# Patient Record
Sex: FEMALE | Race: WHITE | Employment: OTHER | URBAN - METROPOLITAN AREA
[De-identification: names, ages, dates, MRNs, and addresses within clinical notes are randomized per-mention and may not be internally consistent; named-entity substitution may affect disease eponyms.]

---

## 2020-06-29 DIAGNOSIS — Z11.59 ENCOUNTER FOR SCREENING FOR OTHER VIRAL DISEASES: Primary | ICD-10-CM

## 2020-07-09 RX ORDER — IBUPROFEN 200 MG
200-400 TABLET ORAL DAILY PRN
Status: ON HOLD | COMMUNITY
End: 2020-07-14

## 2020-07-10 DIAGNOSIS — Z11.59 ENCOUNTER FOR SCREENING FOR OTHER VIRAL DISEASES: ICD-10-CM

## 2020-07-10 PROCEDURE — U0003 INFECTIOUS AGENT DETECTION BY NUCLEIC ACID (DNA OR RNA); SEVERE ACUTE RESPIRATORY SYNDROME CORONAVIRUS 2 (SARS-COV-2) (CORONAVIRUS DISEASE [COVID-19]), AMPLIFIED PROBE TECHNIQUE, MAKING USE OF HIGH THROUGHPUT TECHNOLOGIES AS DESCRIBED BY CMS-2020-01-R: HCPCS | Performed by: ORTHOPAEDIC SURGERY

## 2020-07-10 PROCEDURE — 99207 ZZC NO BILLABLE SERVICE THIS VISIT: CPT

## 2020-07-10 NOTE — PROGRESS NOTES
PTA medications updated by Medication Scribe prior to surgery via phone call with patient      -LAST DOSES ENTERED BY NURSE-    Medication history sources: Patient, Surescripts and H&P  Medication history source reliability: Good  Adherence assessment: N/A Not Observed    Significant changes made to the medication list:  None      Additional medication history information:   None        Prior to Admission medications    Medication Sig Last Dose Taking? Auth Provider   ibuprofen (ADVIL/MOTRIN) 200 MG tablet Take 200-400 mg by mouth daily as needed   at PRN Yes Reported, Patient

## 2020-07-11 LAB
SARS-COV-2 RNA SPEC QL NAA+PROBE: NOT DETECTED
SPECIMEN SOURCE: NORMAL

## 2020-07-13 ENCOUNTER — HOSPITAL ENCOUNTER (INPATIENT)
Facility: CLINIC | Age: 80
LOS: 1 days | Discharge: HOME | DRG: 469 | End: 2020-07-14
Attending: ORTHOPAEDIC SURGERY | Admitting: ORTHOPAEDIC SURGERY
Payer: COMMERCIAL

## 2020-07-13 ENCOUNTER — ANESTHESIA (OUTPATIENT)
Dept: SURGERY | Facility: CLINIC | Age: 80
DRG: 469 | End: 2020-07-13
Payer: COMMERCIAL

## 2020-07-13 ENCOUNTER — APPOINTMENT (OUTPATIENT)
Dept: GENERAL RADIOLOGY | Facility: CLINIC | Age: 80
DRG: 469 | End: 2020-07-13
Attending: ORTHOPAEDIC SURGERY
Payer: COMMERCIAL

## 2020-07-13 ENCOUNTER — ANESTHESIA EVENT (OUTPATIENT)
Dept: SURGERY | Facility: CLINIC | Age: 80
DRG: 469 | End: 2020-07-13
Payer: COMMERCIAL

## 2020-07-13 DIAGNOSIS — Z96.661 STATUS POST RIGHT ANKLE JOINT REPLACEMENT: Primary | ICD-10-CM

## 2020-07-13 PROBLEM — Z96.669 S/P ANKLE JOINT REPLACEMENT: Status: ACTIVE | Noted: 2020-07-13

## 2020-07-13 LAB
CREAT SERPL-MCNC: 0.92 MG/DL (ref 0.52–1.04)
GFR SERPL CREATININE-BSD FRML MDRD: 59 ML/MIN/{1.73_M2}
PLATELET # BLD AUTO: 347 10E9/L (ref 150–450)

## 2020-07-13 PROCEDURE — 36000063 ZZH SURGERY LEVEL 4 EA 15 ADDTL MIN: Performed by: ORTHOPAEDIC SURGERY

## 2020-07-13 PROCEDURE — 27211022 ZZHC OR IOM SUPPLIES OPNP: Performed by: ORTHOPAEDIC SURGERY

## 2020-07-13 PROCEDURE — 25800030 ZZH RX IP 258 OP 636: Performed by: PHYSICIAN ASSISTANT

## 2020-07-13 PROCEDURE — 25000128 H RX IP 250 OP 636: Performed by: PHYSICIAN ASSISTANT

## 2020-07-13 PROCEDURE — 25000125 ZZHC RX 250: Performed by: NURSE ANESTHETIST, CERTIFIED REGISTERED

## 2020-07-13 PROCEDURE — 25000132 ZZH RX MED GY IP 250 OP 250 PS 637: Performed by: PHYSICIAN ASSISTANT

## 2020-07-13 PROCEDURE — 25000128 H RX IP 250 OP 636: Performed by: ANESTHESIOLOGY

## 2020-07-13 PROCEDURE — 25000125 ZZHC RX 250: Performed by: ORTHOPAEDIC SURGERY

## 2020-07-13 PROCEDURE — 27110028 ZZH OR GENERAL SUPPLY NON-STERILE: Performed by: ORTHOPAEDIC SURGERY

## 2020-07-13 PROCEDURE — 37000009 ZZH ANESTHESIA TECHNICAL FEE, EACH ADDTL 15 MIN: Performed by: ORTHOPAEDIC SURGERY

## 2020-07-13 PROCEDURE — C1713 ANCHOR/SCREW BN/BN,TIS/BN: HCPCS | Performed by: ORTHOPAEDIC SURGERY

## 2020-07-13 PROCEDURE — 27210794 ZZH OR GENERAL SUPPLY STERILE: Performed by: ORTHOPAEDIC SURGERY

## 2020-07-13 PROCEDURE — 25800030 ZZH RX IP 258 OP 636: Performed by: ANESTHESIOLOGY

## 2020-07-13 PROCEDURE — 37000008 ZZH ANESTHESIA TECHNICAL FEE, 1ST 30 MIN: Performed by: ORTHOPAEDIC SURGERY

## 2020-07-13 PROCEDURE — 82565 ASSAY OF CREATININE: CPT | Performed by: PHYSICIAN ASSISTANT

## 2020-07-13 PROCEDURE — 36000065 ZZH SURGERY LEVEL 4 W FLUORO 1ST 30 MIN: Performed by: ORTHOPAEDIC SURGERY

## 2020-07-13 PROCEDURE — 85049 AUTOMATED PLATELET COUNT: CPT | Performed by: PHYSICIAN ASSISTANT

## 2020-07-13 PROCEDURE — 36415 COLL VENOUS BLD VENIPUNCTURE: CPT | Performed by: PHYSICIAN ASSISTANT

## 2020-07-13 PROCEDURE — 25000128 H RX IP 250 OP 636: Performed by: NURSE ANESTHETIST, CERTIFIED REGISTERED

## 2020-07-13 PROCEDURE — 71000012 ZZH RECOVERY PHASE 1 LEVEL 1 FIRST HR: Performed by: ORTHOPAEDIC SURGERY

## 2020-07-13 PROCEDURE — 25000125 ZZHC RX 250: Performed by: ANESTHESIOLOGY

## 2020-07-13 PROCEDURE — 40000171 ZZH STATISTIC PRE-PROCEDURE ASSESSMENT III: Performed by: ORTHOPAEDIC SURGERY

## 2020-07-13 PROCEDURE — 25000566 ZZH SEVOFLURANE, EA 15 MIN: Performed by: ORTHOPAEDIC SURGERY

## 2020-07-13 PROCEDURE — 40000277 XR SURGERY CARM FLUORO LESS THAN 5 MIN W STILLS

## 2020-07-13 PROCEDURE — C1776 JOINT DEVICE (IMPLANTABLE): HCPCS | Performed by: ORTHOPAEDIC SURGERY

## 2020-07-13 PROCEDURE — 12000000 ZZH R&B MED SURG/OB

## 2020-07-13 DEVICE — IMPLANTABLE DEVICE: Type: IMPLANTABLE DEVICE | Site: ANKLE | Status: FUNCTIONAL

## 2020-07-13 DEVICE — IMP SCR ARTHREX BLUE LOCKING 3.5X26MM AR-8935L-26: Type: IMPLANTABLE DEVICE | Site: ANKLE | Status: FUNCTIONAL

## 2020-07-13 DEVICE — IMP SCR ARTHREX CAN 4.0X30MM AR-8940-30: Type: IMPLANTABLE DEVICE | Site: ANKLE | Status: FUNCTIONAL

## 2020-07-13 DEVICE — IMP PLATE ARTHREX MIDFOOT FLAT H SM RT AR-8942R-S: Type: IMPLANTABLE DEVICE | Site: ANKLE | Status: FUNCTIONAL

## 2020-07-13 RX ORDER — FENTANYL CITRATE 0.05 MG/ML
50 INJECTION, SOLUTION INTRAMUSCULAR; INTRAVENOUS
Status: COMPLETED | OUTPATIENT
Start: 2020-07-13 | End: 2020-07-13

## 2020-07-13 RX ORDER — NALOXONE HYDROCHLORIDE 0.4 MG/ML
.1-.4 INJECTION, SOLUTION INTRAMUSCULAR; INTRAVENOUS; SUBCUTANEOUS
Status: DISCONTINUED | OUTPATIENT
Start: 2020-07-13 | End: 2020-07-13

## 2020-07-13 RX ORDER — FENTANYL CITRATE 50 UG/ML
INJECTION, SOLUTION INTRAMUSCULAR; INTRAVENOUS PRN
Status: DISCONTINUED | OUTPATIENT
Start: 2020-07-13 | End: 2020-07-13

## 2020-07-13 RX ORDER — HYDROMORPHONE HYDROCHLORIDE 1 MG/ML
.3-.5 INJECTION, SOLUTION INTRAMUSCULAR; INTRAVENOUS; SUBCUTANEOUS
Status: DISCONTINUED | OUTPATIENT
Start: 2020-07-13 | End: 2020-07-14 | Stop reason: HOSPADM

## 2020-07-13 RX ORDER — ONDANSETRON 2 MG/ML
4 INJECTION INTRAMUSCULAR; INTRAVENOUS EVERY 6 HOURS PRN
Status: DISCONTINUED | OUTPATIENT
Start: 2020-07-13 | End: 2020-07-14 | Stop reason: HOSPADM

## 2020-07-13 RX ORDER — ONDANSETRON 4 MG/1
4 TABLET, ORALLY DISINTEGRATING ORAL EVERY 30 MIN PRN
Status: DISCONTINUED | OUTPATIENT
Start: 2020-07-13 | End: 2020-07-13

## 2020-07-13 RX ORDER — NALOXONE HYDROCHLORIDE 0.4 MG/ML
.1-.4 INJECTION, SOLUTION INTRAMUSCULAR; INTRAVENOUS; SUBCUTANEOUS
Status: DISCONTINUED | OUTPATIENT
Start: 2020-07-13 | End: 2020-07-14 | Stop reason: HOSPADM

## 2020-07-13 RX ORDER — ACETAMINOPHEN 325 MG/1
650 TABLET ORAL EVERY 4 HOURS PRN
Status: DISCONTINUED | OUTPATIENT
Start: 2020-07-16 | End: 2020-07-14 | Stop reason: HOSPADM

## 2020-07-13 RX ORDER — SODIUM CHLORIDE, SODIUM LACTATE, POTASSIUM CHLORIDE, CALCIUM CHLORIDE 600; 310; 30; 20 MG/100ML; MG/100ML; MG/100ML; MG/100ML
INJECTION, SOLUTION INTRAVENOUS CONTINUOUS
Status: DISCONTINUED | OUTPATIENT
Start: 2020-07-13 | End: 2020-07-13 | Stop reason: HOSPADM

## 2020-07-13 RX ORDER — HYDROXYZINE HYDROCHLORIDE 10 MG/1
10 TABLET, FILM COATED ORAL EVERY 6 HOURS PRN
Status: DISCONTINUED | OUTPATIENT
Start: 2020-07-13 | End: 2020-07-14 | Stop reason: HOSPADM

## 2020-07-13 RX ORDER — METOCLOPRAMIDE 5 MG/1
5 TABLET ORAL EVERY 6 HOURS PRN
Status: DISCONTINUED | OUTPATIENT
Start: 2020-07-13 | End: 2020-07-14 | Stop reason: HOSPADM

## 2020-07-13 RX ORDER — CEFAZOLIN SODIUM 2 G/100ML
2 INJECTION, SOLUTION INTRAVENOUS
Status: DISCONTINUED | OUTPATIENT
Start: 2020-07-13 | End: 2020-07-13 | Stop reason: HOSPADM

## 2020-07-13 RX ORDER — AMOXICILLIN 250 MG
1 CAPSULE ORAL 2 TIMES DAILY
Status: DISCONTINUED | OUTPATIENT
Start: 2020-07-13 | End: 2020-07-14 | Stop reason: HOSPADM

## 2020-07-13 RX ORDER — SODIUM CHLORIDE, SODIUM LACTATE, POTASSIUM CHLORIDE, CALCIUM CHLORIDE 600; 310; 30; 20 MG/100ML; MG/100ML; MG/100ML; MG/100ML
INJECTION, SOLUTION INTRAVENOUS CONTINUOUS
Status: DISCONTINUED | OUTPATIENT
Start: 2020-07-13 | End: 2020-07-13

## 2020-07-13 RX ORDER — DEXAMETHASONE SODIUM PHOSPHATE 4 MG/ML
4 INJECTION, SOLUTION INTRA-ARTICULAR; INTRALESIONAL; INTRAMUSCULAR; INTRAVENOUS; SOFT TISSUE EVERY 10 MIN PRN
Status: DISCONTINUED | OUTPATIENT
Start: 2020-07-13 | End: 2020-07-13 | Stop reason: CLARIF

## 2020-07-13 RX ORDER — MEPERIDINE HYDROCHLORIDE 25 MG/ML
12.5 INJECTION INTRAMUSCULAR; INTRAVENOUS; SUBCUTANEOUS
Status: DISCONTINUED | OUTPATIENT
Start: 2020-07-13 | End: 2020-07-13 | Stop reason: CLARIF

## 2020-07-13 RX ORDER — DIMENHYDRINATE 50 MG/ML
12.5 INJECTION, SOLUTION INTRAMUSCULAR; INTRAVENOUS
Status: DISCONTINUED | OUTPATIENT
Start: 2020-07-13 | End: 2020-07-14 | Stop reason: HOSPADM

## 2020-07-13 RX ORDER — ONDANSETRON 2 MG/ML
INJECTION INTRAMUSCULAR; INTRAVENOUS PRN
Status: DISCONTINUED | OUTPATIENT
Start: 2020-07-13 | End: 2020-07-13

## 2020-07-13 RX ORDER — METHOCARBAMOL 500 MG/1
500 TABLET, FILM COATED ORAL 4 TIMES DAILY PRN
Status: DISCONTINUED | OUTPATIENT
Start: 2020-07-13 | End: 2020-07-14 | Stop reason: HOSPADM

## 2020-07-13 RX ORDER — METOCLOPRAMIDE HYDROCHLORIDE 5 MG/ML
5 INJECTION INTRAMUSCULAR; INTRAVENOUS EVERY 6 HOURS PRN
Status: DISCONTINUED | OUTPATIENT
Start: 2020-07-13 | End: 2020-07-14 | Stop reason: HOSPADM

## 2020-07-13 RX ORDER — CEFAZOLIN SODIUM 1 G/3ML
1 INJECTION, POWDER, FOR SOLUTION INTRAMUSCULAR; INTRAVENOUS SEE ADMIN INSTRUCTIONS
Status: DISCONTINUED | OUTPATIENT
Start: 2020-07-13 | End: 2020-07-13 | Stop reason: HOSPADM

## 2020-07-13 RX ORDER — CEFAZOLIN SODIUM 2 G/100ML
2 INJECTION, SOLUTION INTRAVENOUS
Status: COMPLETED | OUTPATIENT
Start: 2020-07-13 | End: 2020-07-13

## 2020-07-13 RX ORDER — LIDOCAINE 40 MG/G
CREAM TOPICAL
Status: DISCONTINUED | OUTPATIENT
Start: 2020-07-13 | End: 2020-07-14 | Stop reason: HOSPADM

## 2020-07-13 RX ORDER — ACETAMINOPHEN 325 MG/1
975 TABLET ORAL EVERY 8 HOURS
Status: DISCONTINUED | OUTPATIENT
Start: 2020-07-13 | End: 2020-07-14 | Stop reason: HOSPADM

## 2020-07-13 RX ORDER — FENTANYL CITRATE 0.05 MG/ML
25-50 INJECTION, SOLUTION INTRAMUSCULAR; INTRAVENOUS EVERY 5 MIN PRN
Status: DISCONTINUED | OUTPATIENT
Start: 2020-07-13 | End: 2020-07-13 | Stop reason: CLARIF

## 2020-07-13 RX ORDER — CEFAZOLIN SODIUM 1 G/3ML
1 INJECTION, POWDER, FOR SOLUTION INTRAMUSCULAR; INTRAVENOUS EVERY 8 HOURS
Status: COMPLETED | OUTPATIENT
Start: 2020-07-13 | End: 2020-07-14

## 2020-07-13 RX ORDER — ALBUTEROL SULFATE 0.83 MG/ML
2.5 SOLUTION RESPIRATORY (INHALATION)
Status: DISCONTINUED | OUTPATIENT
Start: 2020-07-13 | End: 2020-07-13 | Stop reason: CLARIF

## 2020-07-13 RX ORDER — CALCIUM CARBONATE 500 MG/1
1000 TABLET, CHEWABLE ORAL 4 TIMES DAILY PRN
Status: DISCONTINUED | OUTPATIENT
Start: 2020-07-13 | End: 2020-07-14 | Stop reason: HOSPADM

## 2020-07-13 RX ORDER — OXYCODONE HYDROCHLORIDE 5 MG/1
5-10 TABLET ORAL EVERY 4 HOURS PRN
Status: DISCONTINUED | OUTPATIENT
Start: 2020-07-13 | End: 2020-07-14 | Stop reason: HOSPADM

## 2020-07-13 RX ORDER — ONDANSETRON 2 MG/ML
4 INJECTION INTRAMUSCULAR; INTRAVENOUS EVERY 30 MIN PRN
Status: DISCONTINUED | OUTPATIENT
Start: 2020-07-13 | End: 2020-07-13

## 2020-07-13 RX ORDER — ONDANSETRON 4 MG/1
4 TABLET, ORALLY DISINTEGRATING ORAL EVERY 6 HOURS PRN
Status: DISCONTINUED | OUTPATIENT
Start: 2020-07-13 | End: 2020-07-14 | Stop reason: HOSPADM

## 2020-07-13 RX ORDER — PROCHLORPERAZINE MALEATE 5 MG
5 TABLET ORAL EVERY 6 HOURS PRN
Status: DISCONTINUED | OUTPATIENT
Start: 2020-07-13 | End: 2020-07-14 | Stop reason: HOSPADM

## 2020-07-13 RX ORDER — PROPOFOL 10 MG/ML
INJECTION, EMULSION INTRAVENOUS CONTINUOUS PRN
Status: DISCONTINUED | OUTPATIENT
Start: 2020-07-13 | End: 2020-07-13

## 2020-07-13 RX ORDER — MAGNESIUM HYDROXIDE 1200 MG/15ML
LIQUID ORAL PRN
Status: DISCONTINUED | OUTPATIENT
Start: 2020-07-13 | End: 2020-07-13 | Stop reason: HOSPADM

## 2020-07-13 RX ORDER — SODIUM CHLORIDE, SODIUM LACTATE, POTASSIUM CHLORIDE, CALCIUM CHLORIDE 600; 310; 30; 20 MG/100ML; MG/100ML; MG/100ML; MG/100ML
INJECTION, SOLUTION INTRAVENOUS CONTINUOUS
Status: DISCONTINUED | OUTPATIENT
Start: 2020-07-13 | End: 2020-07-14 | Stop reason: HOSPADM

## 2020-07-13 RX ORDER — PROPOFOL 10 MG/ML
INJECTION, EMULSION INTRAVENOUS PRN
Status: DISCONTINUED | OUTPATIENT
Start: 2020-07-13 | End: 2020-07-13

## 2020-07-13 RX ORDER — AMOXICILLIN 250 MG
2 CAPSULE ORAL 2 TIMES DAILY
Status: DISCONTINUED | OUTPATIENT
Start: 2020-07-13 | End: 2020-07-14 | Stop reason: HOSPADM

## 2020-07-13 RX ORDER — LIDOCAINE HYDROCHLORIDE 20 MG/ML
INJECTION, SOLUTION INFILTRATION; PERINEURAL PRN
Status: DISCONTINUED | OUTPATIENT
Start: 2020-07-13 | End: 2020-07-13

## 2020-07-13 RX ORDER — DEXAMETHASONE SODIUM PHOSPHATE 4 MG/ML
INJECTION, SOLUTION INTRA-ARTICULAR; INTRALESIONAL; INTRAMUSCULAR; INTRAVENOUS; SOFT TISSUE PRN
Status: DISCONTINUED | OUTPATIENT
Start: 2020-07-13 | End: 2020-07-13

## 2020-07-13 RX ORDER — HYDROMORPHONE HYDROCHLORIDE 1 MG/ML
.3-.5 INJECTION, SOLUTION INTRAMUSCULAR; INTRAVENOUS; SUBCUTANEOUS EVERY 10 MIN PRN
Status: DISCONTINUED | OUTPATIENT
Start: 2020-07-13 | End: 2020-07-13

## 2020-07-13 RX ADMIN — LIDOCAINE HYDROCHLORIDE 100 MG: 20 INJECTION, SOLUTION INFILTRATION; PERINEURAL at 14:19

## 2020-07-13 RX ADMIN — SODIUM CHLORIDE, POTASSIUM CHLORIDE, SODIUM LACTATE AND CALCIUM CHLORIDE: 600; 310; 30; 20 INJECTION, SOLUTION INTRAVENOUS at 17:49

## 2020-07-13 RX ADMIN — ACETAMINOPHEN 975 MG: 325 TABLET, FILM COATED ORAL at 21:15

## 2020-07-13 RX ADMIN — PROPOFOL 130 MG: 10 INJECTION, EMULSION INTRAVENOUS at 14:19

## 2020-07-13 RX ADMIN — SODIUM CHLORIDE, POTASSIUM CHLORIDE, SODIUM LACTATE AND CALCIUM CHLORIDE: 600; 310; 30; 20 INJECTION, SOLUTION INTRAVENOUS at 14:06

## 2020-07-13 RX ADMIN — SODIUM CHLORIDE, POTASSIUM CHLORIDE, SODIUM LACTATE AND CALCIUM CHLORIDE: 600; 310; 30; 20 INJECTION, SOLUTION INTRAVENOUS at 12:47

## 2020-07-13 RX ADMIN — MIDAZOLAM HYDROCHLORIDE 1 MG: 1 INJECTION, SOLUTION INTRAMUSCULAR; INTRAVENOUS at 12:56

## 2020-07-13 RX ADMIN — BUPIVACAINE HYDROCHLORIDE 30 ML GIVEN: 5 INJECTION, SOLUTION EPIDURAL; INTRACAUDAL; PERINEURAL at 13:09

## 2020-07-13 RX ADMIN — PROPOFOL 150 MCG/KG/MIN: 10 INJECTION, EMULSION INTRAVENOUS at 14:19

## 2020-07-13 RX ADMIN — CEFAZOLIN SODIUM 2 G: 2 INJECTION, SOLUTION INTRAVENOUS at 14:20

## 2020-07-13 RX ADMIN — DOCUSATE SODIUM AND SENNOSIDES 1 TABLET: 8.6; 5 TABLET, FILM COATED ORAL at 21:16

## 2020-07-13 RX ADMIN — DEXAMETHASONE SODIUM PHOSPHATE 4 MG: 4 INJECTION, SOLUTION INTRA-ARTICULAR; INTRALESIONAL; INTRAMUSCULAR; INTRAVENOUS; SOFT TISSUE at 14:20

## 2020-07-13 RX ADMIN — CEFAZOLIN 1 G: 330 INJECTION, POWDER, FOR SOLUTION INTRAMUSCULAR; INTRAVENOUS at 21:16

## 2020-07-13 RX ADMIN — FENTANYL CITRATE 25 MCG: 50 INJECTION, SOLUTION INTRAMUSCULAR; INTRAVENOUS at 14:19

## 2020-07-13 RX ADMIN — FENTANYL CITRATE 25 MCG: 0.05 INJECTION, SOLUTION INTRAMUSCULAR; INTRAVENOUS at 12:58

## 2020-07-13 RX ADMIN — ONDANSETRON 4 MG: 2 INJECTION INTRAMUSCULAR; INTRAVENOUS at 14:19

## 2020-07-13 SDOH — HEALTH STABILITY: MENTAL HEALTH: HOW OFTEN DO YOU HAVE A DRINK CONTAINING ALCOHOL?: NEVER

## 2020-07-13 ASSESSMENT — COPD QUESTIONNAIRES: COPD: 0

## 2020-07-13 ASSESSMENT — ENCOUNTER SYMPTOMS
DYSRHYTHMIAS: 0
SEIZURES: 0

## 2020-07-13 ASSESSMENT — MIFFLIN-ST. JEOR: SCORE: 1024.29

## 2020-07-13 ASSESSMENT — ACTIVITIES OF DAILY LIVING (ADL): ADLS_ACUITY_SCORE: 12

## 2020-07-13 NOTE — ANESTHESIA POSTPROCEDURE EVALUATION
Patient: Whitney Stout    Procedure(s):  RIGHT TOTAL ANKLE ARTHROPLASTY,  RIGHT CALCANEOCUBOID FUSION,  HARDWARE REMOVAL  LATERAL LIGAMENT REPAIR    Diagnosis:Primary osteoarthritis of left ankle [M19.072]  Diagnosis Additional Information: No value filed.    Anesthesia Type:  General, Peripheral Nerve Block    Note:  Anesthesia Post Evaluation    Patient location during evaluation: PACU  Patient participation: Able to fully participate in evaluation  Level of consciousness: awake and alert  Pain management: adequate  Airway patency: patent  Cardiovascular status: acceptable  Respiratory status: acceptable and unassisted  Hydration status: acceptable  PONV: none             Last vitals:  Vitals:    07/13/20 1545 07/13/20 1600 07/13/20 1615   BP: (!) 149/96 (!) 146/94    Pulse: 73 72    Resp: 13 18    Temp:   36.3  C (97.4  F)   SpO2: 100% 94%          Electronically Signed By: Debbie Mejia MD  July 13, 2020  4:19 PM

## 2020-07-13 NOTE — ANESTHESIA PREPROCEDURE EVALUATION
Anesthesia Pre-Procedure Evaluation    Patient: Whitney Stout   MRN: [id number] : 1940          Preoperative Diagnosis: Primary osteoarthritis of left ankle [M19.072]    Procedure(s):  LEFT TOTAL ANKLE ARTHROPLASTY,  LEFT CALANEOCUBOID FUSION,  REMOVAL SCREWS  BROSTROM AND INTERNAL BRACE    Past Medical History:   Diagnosis Date     BCC (basal cell carcinoma of skin)      DJD (degenerative joint disease)      Hypertension      Pernicious anemia      Past Surgical History:   Procedure Laterality Date     COLONOSCOPY       ORTHOPEDIC SURGERY      rotator cuff     ORTHOPEDIC SURGERY      knee arthro     TUBAL LIGATION         Anesthesia Evaluation     .             ROS/MED HX    ENT/Pulmonary:      (-) asthma, COPD and sleep apnea   Neurologic:      (-) seizures, CVA and TIA   Cardiovascular:     (+) hypertension----. : . . . :. . Previous cardiac testing date:results:date: results:ECG reviewed date:2020 results:Per CE report, NSR with 1st degree AVB date: results:         (-) CAD and arrhythmias   METS/Exercise Tolerance:     Hematologic:     (+) Anemia, -      Musculoskeletal:   (+) arthritis,  -       GI/Hepatic:        (-) GERD and liver disease   Renal/Genitourinary:      (-) renal disease   Endo:      (-) Type I DM and Type II DM   Psychiatric:         Infectious Disease:         Malignancy:         Other:                          Physical Exam      Airway   Mallampati: II  TM distance: >3 FB  Neck ROM: full    Dental   (+) upper dentures    Cardiovascular   Rhythm and rate: regular      Pulmonary    breath sounds clear to auscultation            No results found for: WBC, HGB, HCT, PLT, CRP, SED, NA, POTASSIUM, CHLORIDE, CO2, BUN, CR, GLC, RASTA, PHOS, MAG, ALBUMIN, PROTTOTAL, ALT, AST, GGT, ALKPHOS, BILITOTAL, BILIDIRECT, LIPASE, AMYLASE, CAITLYN, PTT, INR, FIBR, TSH, T4, T3, HCG, HCGS, CKTOTAL, CKMB, TROPN    Preop Vitals  BP Readings from Last 3 Encounters:   No data found for BP    Pulse Readings from  Last 3 Encounters:   No data found for Pulse      Resp Readings from Last 3 Encounters:   No data found for Resp    SpO2 Readings from Last 3 Encounters:   No data found for SpO2      Temp Readings from Last 1 Encounters:   No data found for Temp    Ht Readings from Last 1 Encounters:   No data found for Ht      Wt Readings from Last 1 Encounters:   No data found for Wt    There is no height or weight on file to calculate BMI.       Anesthesia Plan      History & Physical Review  History and physical reviewed and following examination; no interval change.    ASA Status:  2 .    NPO Status:  > 8 hours    Plan for General and Peripheral Nerve Block (LMA) with Intravenous and Propofol induction. Maintenance will be Balanced.    PONV prophylaxis:  Ondansetron (or other 5HT-3) and Dexamethasone or Solumedrol         Postoperative Care  Postoperative pain management:  Peripheral nerve block (Single Shot).      Consents  Anesthetic plan, risks, benefits and alternatives discussed with:  Patient..                 Gary Arceo MD

## 2020-07-13 NOTE — ANESTHESIA PROCEDURE NOTES
Procedure note : Adductor canal  Staff -   Anesthesiologist:  Gary Arceo MD      Performed By: Anesthesiologist        Pre-Procedure  Performed by Gary Arceo MD  Location: pre-op      Pre-Anesthestic Checklist: patient identified, IV checked, site marked, risks and benefits discussed, informed consent, monitors and equipment checked, pre-op evaluation, at physician/surgeon's request and post-op pain management    Timeout  Correct Patient: Yes   Correct Procedure: Yes   Correct Site: Yes   Correct Laterality: Yes   Correct Position: Yes   Site Marked: Yes   .   Procedure Documentation    .    Procedure: Adductor canal, right.   Patient Position:supine Local skin infiltrated with mL of 1% lidocaine.    Ultrasound used to identify targeted nerve, plexus, or vascular marker and placed a needle adjacent to it., Ultrasound was used to visualize the spread of the anesthetic in close proximity to the above stated nerve. A permanent image is entered into the patient's record.  Patient Prep/Sterile Barriers; mask, sterile gloves, chlorhexidine gluconate and isopropyl alcohol.  .  Needle: insulated   Needle Gauge: 22.    Needle Length (Inches) 3.13   Insertion Method: Single Shot.        Assessment/Narrative  Paresthesias: No.  .  The placement was negative for: blood aspirated, painful injection and site bleeding.  Bolus given via needle..   Secured via.   Complications:. Comments:  Femoral artery clearly identified deep to the sartorius muscle via ultrasound imaging.  After appropriate timeout procedure, needle was advanced under direct ultrasound guidance.  10 ml of 0.5% bupivacaine with 1:400,000 epinephrine was incrementally injected with negative aspiration every 5 ml.  Patient tolerated procedure well.    Ultrasound Interpretation, peripheral nerve block    1.  As noted above, under ultrasound guidance, the needle was inserted and placed in close proximity to the saphenous nerve.  2. Ultrasound was also  used to visualize the spread of the anesthetic in close proximity to the nerve being blocked.  3. The nerve appeared anatomically normal.  4. There were no apparent abnormal pathological findings.  5. A permanent ultrasound image was saved n the patient's record.    Gary Arceo MD   1:23 PM

## 2020-07-13 NOTE — ANESTHESIA PROCEDURE NOTES
Procedure note : Sciatic  Staff -   Anesthesiologist:  Gary Arceo MD      Performed By: Anesthesiologist        Pre-Procedure  Performed by Gary Arceo MD  Location: pre-op      Pre-Anesthestic Checklist: patient identified, IV checked, site marked, risks and benefits discussed, informed consent, monitors and equipment checked, pre-op evaluation, at physician/surgeon's request and post-op pain management    Timeout  Correct Patient: Yes   Correct Procedure: Yes   Correct Site: Yes   Correct Laterality: Yes   Correct Position: Yes   Site Marked: Yes   .   Procedure Documentation    .    Procedure: Sciatic, right.   Patient Position:supine (Operative leg raised on cushion) Local skin infiltrated with mL of 1% lidocaine.    Ultrasound used to identify targeted nerve, plexus, or vascular marker and placed a needle adjacent to it., Ultrasound was used to visualize the spread of the anesthetic in close proximity to the above stated nerve. A permanent image is entered into the patient's record.  Patient Prep/Sterile Barriers; mask, sterile gloves, chlorhexidine gluconate and isopropyl alcohol.  .  Needle: insulated   Needle Gauge: 21.    Needle Length (Inches) 4   Insertion Method: Single Shot.        Assessment/Narrative  Paresthesias: Resolved.  .  The placement was negative for: blood aspirated, painful injection and site bleeding.  Bolus given via needle..   Secured via.   Complications:. Comments:  Nerve clearly identified via ultrasound imaging.  After appropriate timeout procedure, needle was advanced under direct ultrasound guidance.  20 ml of 0.5% bupivacaine with 1:400,000 epinephrine was incrementally injected with negative aspiration every 5 ml.  Patient tolerated procedure well.  Ultrasound Interpretation, peripheral nerve block    1.  As noted above, under ultrasound guidance, the needle was inserted and placed in close proximity to the sciatic nerve.  2. Ultrasound was also used to visualize  the spread of the anesthetic in close proximity to the nerve being blocked.  3. The nerve appeared anatomically normal.  4. There were no apparent abnormal pathological findings.  5. A permanent ultrasound image was saved n the patient's record.    Gary Arceo MD   1:23 PM

## 2020-07-13 NOTE — ANESTHESIA CARE TRANSFER NOTE
Patient: Whitney Stout    Procedure(s):  RIGHT TOTAL ANKLE ARTHROPLASTY,  RIGHT CALCANEOCUBOID FUSION,  HARDWARE REMOVAL  LATERAL LIGAMENT REPAIR    Diagnosis: Primary osteoarthritis of left ankle [M19.072]  Diagnosis Additional Information: No value filed.    Anesthesia Type:   General, Peripheral Nerve Block     Note:  Airway :Face Mask          Vitals: (Last set prior to Anesthesia Care Transfer)    CRNA VITALS  7/13/2020 1456 - 7/13/2020 1531      7/13/2020             Resp Rate (set):  10                Electronically Signed By: JUVE Payan CRNA  July 13, 2020  3:31 PM

## 2020-07-13 NOTE — BRIEF OP NOTE
[facility]    Brief Operative Note    Pre-operative diagnosis: Primary osteoarthritis of left ankle [M19.072]  Post-operative diagnosis R ankle arthritis    Procedure: Procedure(s):  RIGHT TOTAL ANKLE ARTHROPLASTY,  RIGHT CALCANEOCUBOID FUSION,  HARDWARE REMOVAL  LATERAL LIGAMENT REPAIR  Surgeon: Surgeon(s) and Role:     * Danielle Hargrove MD - Primary     * Demetrio Lester PA-C - Assisting  Anesthesia: General   Estimated blood loss: Less than 50 ml  Drains: None  Specimens: * No specimens in log *  Findings:   Expected.  Complications: None.  Implants:   Implant Name Type Inv. Item Serial No.  Lot No. LRB No. Used Action   INSERT TIBIAL SZ 00X 8MM R Total Joint Component/Insert INSERT TIBIAL SZ 00X 8MM R  [employer] [id number] Right 1 Implanted   IMP COMP TORNIER TALAR ANKLE SIZE 0 RT [id number] Total Joint Component/Insert IMP COMP TORNIER TALAR ANKLE SIZE 0 RT [id number]  [employer] [id number] Right 1 Implanted   TIBIAL TRAY XL SIZE 0    [employer] [id number] Right 1 Implanted   IMP SCR ARTHREX BLUE LOCKING 3.5X26MM [id number] Metallic Hardware/Rousseau IMP SCR ARTHREX BLUE LOCKING 3.5X26MM [id number]  ARTHREX 41 09 [id number] Right 2 Implanted   IMP SCR ARTHREX CAN 4.0X26MM AR-8940-26 Metallic Hardware/Rousseau IMP SCR ARTHREX CAN 4.0X26MM AR-8940-26  [employer] 41 09 [id number] Right 1 Implanted   IMP SCR ARTHREX CAN 4.0X30MM AR-8940-30 Metallic Hardware/Rousseau IMP SCR ARTHREX CAN 4.0X30MM AR-8940-30  AR[employer]X 41 09 [id number] Right 1 Implanted   IMP PLATE ARTHREX MIDFOOT FLAT H SM RT AR-[id number] Metallic Hardware/Rousseau IMP PLATE ARTHREX MIDFOOT FLAT H SM RT AR-[id number]-S  AR[employer] 41 09 [id number] Right 1 Implanted   IMP SCR ARTHREX CAN 4.0X26MM AR-8940-26 Metallic Hardware/Rousseau IMP SCR ARTHREX CAN 4.0X26MM AR-8940-26  AR[employer]REX 41 09 [id number] Right 1 Wasted   Long Screw (Explanted)      Right 1 Explanted

## 2020-07-14 ENCOUNTER — APPOINTMENT (OUTPATIENT)
Dept: PHYSICAL THERAPY | Facility: CLINIC | Age: 80
DRG: 469 | End: 2020-07-14
Attending: ORTHOPAEDIC SURGERY
Payer: COMMERCIAL

## 2020-07-14 VITALS
RESPIRATION RATE: 16 BRPM | BODY MASS INDEX: 21.05 KG/M2 | DIASTOLIC BLOOD PRESSURE: 98 MMHG | WEIGHT: 123.3 LBS | OXYGEN SATURATION: 94 % | HEIGHT: 64 IN | TEMPERATURE: 98.1 F | SYSTOLIC BLOOD PRESSURE: 162 MMHG | HEART RATE: 94 BPM

## 2020-07-14 LAB — GLUCOSE BLDC GLUCOMTR-MCNC: 107 MG/DL (ref 70–99)

## 2020-07-14 PROCEDURE — 97161 PT EVAL LOW COMPLEX 20 MIN: CPT | Mod: GP | Performed by: PHYSICAL THERAPIST

## 2020-07-14 PROCEDURE — 0QSL04Z REPOSITION RIGHT TARSAL WITH INTERNAL FIXATION DEVICE, OPEN APPROACH: ICD-10-PCS | Performed by: ORTHOPAEDIC SURGERY

## 2020-07-14 PROCEDURE — 97530 THERAPEUTIC ACTIVITIES: CPT | Mod: GP | Performed by: PHYSICAL THERAPIST

## 2020-07-14 PROCEDURE — 25000132 ZZH RX MED GY IP 250 OP 250 PS 637: Performed by: PHYSICIAN ASSISTANT

## 2020-07-14 PROCEDURE — 0SPF04Z REMOVAL OF INTERNAL FIXATION DEVICE FROM RIGHT ANKLE JOINT, OPEN APPROACH: ICD-10-PCS | Performed by: ORTHOPAEDIC SURGERY

## 2020-07-14 PROCEDURE — 25000128 H RX IP 250 OP 636: Performed by: PHYSICIAN ASSISTANT

## 2020-07-14 PROCEDURE — 00000146 ZZHCL STATISTIC GLUCOSE BY METER IP

## 2020-07-14 PROCEDURE — 0SRF0JA REPLACEMENT OF RIGHT ANKLE JOINT WITH SYNTHETIC SUBSTITUTE, UNCEMENTED, OPEN APPROACH: ICD-10-PCS | Performed by: ORTHOPAEDIC SURGERY

## 2020-07-14 PROCEDURE — 0SGH04Z FUSION OF RIGHT TARSAL JOINT WITH INTERNAL FIXATION DEVICE, OPEN APPROACH: ICD-10-PCS | Performed by: ORTHOPAEDIC SURGERY

## 2020-07-14 PROCEDURE — 97116 GAIT TRAINING THERAPY: CPT | Mod: GP | Performed by: PHYSICAL THERAPIST

## 2020-07-14 RX ORDER — OXYCODONE AND ACETAMINOPHEN 5; 325 MG/1; MG/1
1-2 TABLET ORAL EVERY 4 HOURS PRN
Qty: 40 TABLET | Refills: 0 | Status: SHIPPED | OUTPATIENT
Start: 2020-07-14

## 2020-07-14 RX ORDER — ONDANSETRON 4 MG/1
4 TABLET, ORALLY DISINTEGRATING ORAL EVERY 6 HOURS PRN
Qty: 12 TABLET | Refills: 1 | Status: SHIPPED | OUTPATIENT
Start: 2020-07-14

## 2020-07-14 RX ORDER — AMOXICILLIN 250 MG
2 CAPSULE ORAL 2 TIMES DAILY
Qty: 40 TABLET | Refills: 1 | Status: SHIPPED | OUTPATIENT
Start: 2020-07-14

## 2020-07-14 RX ADMIN — ACETAMINOPHEN 975 MG: 325 TABLET, FILM COATED ORAL at 06:27

## 2020-07-14 RX ADMIN — CEFAZOLIN 1 G: 330 INJECTION, POWDER, FOR SOLUTION INTRAMUSCULAR; INTRAVENOUS at 06:18

## 2020-07-14 RX ADMIN — OXYCODONE HYDROCHLORIDE 5 MG: 5 TABLET ORAL at 11:49

## 2020-07-14 RX ADMIN — ACETAMINOPHEN 975 MG: 325 TABLET, FILM COATED ORAL at 15:11

## 2020-07-14 RX ADMIN — OXYCODONE HYDROCHLORIDE 5 MG: 5 TABLET ORAL at 16:11

## 2020-07-14 RX ADMIN — ENOXAPARIN SODIUM 40 MG: 40 INJECTION SUBCUTANEOUS at 09:00

## 2020-07-14 ASSESSMENT — ACTIVITIES OF DAILY LIVING (ADL)
ADLS_ACUITY_SCORE: 12

## 2020-07-14 NOTE — OP NOTE
Procedure Date: 07/13/2020      PREOPERATIVE DIAGNOSES:   1.  Advanced degenerative changes of the right ankle.   2.  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   3.  Retained hardware from a previous subtalar fusion.      POSTOPERATIVE DIAGNOSES:   1.  Advanced degenerative changes of the right ankle.   2.  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   3.  Retained hardware from a previous subtalar fusion.      SURGICAL PROCEDURES:   1.  Right total ankle replacement using a Krystian Talaris size 0 long base tibia, 0 talus, and 8 mm polyethylene spacer.   2.  Lateral closing wedge osteotomy through the right calcaneocuboid joint with a calcaneocuboid joint fusion.   3.  Removal of screw from the right subtalar joint.      ANESTHESIA:  General.      SURGEON:  Danielle Hargrove MD      ASSISTANT:  ATUL Stewart      PREAMBLE:  Ms. Stout has severe degenerative changes of her ankle.  She previously had a subtalar fusion done, which is well healed.  She also previously had a peroneal tendon repair done, but has deficient peroneal structures with significant supination and collapse of her foot.  Informed consent was obtained for the above-mentioned procedures.      DESCRIPTION OF PROCEDURE:  After adequate induction of a general anesthetic, the patient was positioned supine on the operating table.  The right leg was sterilely prepped and free-draped in the usual fashion.  Tourniquet around the thigh was inflated to 300 mmHg.      A standard 15 cm midline incision was used anterior over the ankle.  The interval between extensor hallucis longus and tibialis anterior was used to expose the joint.  The osteophytes were removed from the front of the joint.      The external guide was then used to do the distal tibial cut 9 mm from the articular surface.  The bone was removed.  The  central talar pin was placed and the chamfer cuts were done for the talus.      Care was taken to correct the slight varus deformity in the joint.      The ankle measured to a size 0.  Trial reduction was done, and there was good stability and range of motion with an 8 mm polyethylene spacer.      The trial components were then removed.  The ankle was thoroughly debrided and the definitive components impacted in place.      This was followed by a 6 cm incision lateral over the calcaneocuboid joint.  The peroneal tendons were protected.  A lateral closing wedge osteotomy was done through the calcaneocuboid joint to correct the varus and supination deformity.  The joint was reduced and immobilized with an H plate and 4 screws with excellent alignment.      This was followed by an incision posterior over the calcaneus.  The large subtalar screw was identified and removed.      Tourniquet was then deflated.  Hemostasis obtained.  The wounds closed in layers.  A sterile dressing and a light compressive bandage applied, followed by a short leg cast.      She can ambulate toe-touch weightbearing with crutches.  Sutures will be removed in 2 weeks.         RITO PETERSON MD             D: 2020   T: 2020   MT: TRACY      Name:     DARREN POLLARD   MRN:      [id number]        Account:        [id number]   :      1940           Procedure Date: 2020      Document: [id number]

## 2020-07-14 NOTE — PROGRESS NOTES
Spiritual [facility]  56    SH visited Pt per request. Pt said this surgery on her ankle is her third surgery on her foot so far. She is hopeful that it will be the last surgery and says she is tired of going through surgeries at her age. She explained she has also had multiple knee surgeries and arm surgeries as well. She said her pain isn t very bad right now and she is excited to be going home today.   SH listened, provided support and provided prayer.     Pt is coping well through healing process and finds support in her luc.     SH will remain available as needed.     Zuleyma Tolbert  Chaplain Resident

## 2020-07-14 NOTE — PROGRESS NOTES
Pt A&O X4. VSS on RA. CMS intact since late morning. Oxycodone given for pain. Reviewed AVS with pt, discharge medications given, discharging home.

## 2020-07-14 NOTE — PROGRESS NOTES
Whitney Girish  2020  POD #1 R TAA, corrective osteotomy    Doing well.  No immediate surgical complications identified.  No excessive bleeding  Pain well-controlled.  Temperatures:  Current - Temp: 97.5  F (36.4  C); Max - Temp  Av.7  F (36.5  C)  Min: 97.2  F (36.2  C)  Max: 98.2  F (36.8  C)  Pulse range: Pulse  Av.8  Min: 72  Max: 90  Blood pressure range: Systolic (24hrs), Av , Min:130 , Max:173   ; Diastolic (24hrs), Av, Min:68, Max:133    CMS: block still intact to R LE  Labs:none    PLAN:PT eval this AM, plan discharge home today.

## 2020-07-14 NOTE — PLAN OF CARE
Pt arrived on unit at 4:45pm. A/Ox4, able to voice needs. VSS RA, capno on. Stood up at bedside with assist x2, GB, walker. IV infusing, intermittent abx. Voiding on bedpan. Tolerating regular diet, audible BS. RLE numbness/tingling from nerve block, otherwise CMS intact. Dressing CDI. Denies pain, scheduled tylenol given. Continue to monitor.

## 2020-07-14 NOTE — PLAN OF CARE
Pt A&Ox4. VSS on RA. CMS intact, except numbness due to nerve block. Dressing c/d/i. Voiding in BSC adequately. Up with assist of 1 with walker and gait belt. WBAT, foot flat. Pain managed with scheduled tylenol. RLE elevated. IV SL. Will continue to monitor.

## 2020-07-14 NOTE — PLAN OF CARE
Discharge Planner OT   Patient plan for discharge: N/A  Current status: Orders rec'd and chart reviewed. Pt s/p R TAA, previously living alone and independent in single level home with single step to enter. Per chart, this is her 3rd foot surgery. Spoke with PT, pt I with dressing and has tub bench at home and does not have any ADL concerns. OT not warranted.   Barriers to return to prior living situation: See PT note  Recommendations for discharge: See PT note  Rationale for recommendations: OT order completed.       Entered by: Mildred Dao 07/14/2020 12:14 PM

## 2020-07-14 NOTE — PLAN OF CARE
PT:  Orders received, eval and treatment initiated. Pt s/p R TAA, previously living alone and independent in single level home with single step to enter. Pt reports being very active outside of her home, plans to return home from hospital, daughters to come and stay for a few days. Pt has arranged for Knee scooter to be delivered to home but does not have a walker to use, considering getting one from [facility]  Patient plan: return home, daughters to come to stay thru weekend  Current status: Pt completed bed mobility with mod independence, sitting EOB completing own dressing into her going home clothing. Assist to don post op shoes and inst in how to remove. Sit to stand transfers with WW and CGA initially, needing cues for hand placement. Pt instructed in standing transfers onto knee scooter, brake operations, explaining hers may look somewhat different, pt verbalizing understanding. Pt ambulated 50 ft with WW and SBA initially progressing to mod indep, step to pattern with L WBAT, no push off. Pt ambulated 150ft  with knee scooter and SBA, good control and balance, min cues for engaging brakes for transfers. Pt negotiated single step with WW and SBA as per home set up. Discussed need for WW at home, option to  one from [facility] pro/cons on availability and time added to already 2.5 hour trip home. Pt in agreement to have WW issued from hospital. OT screen completed, pt voicing no concerns for personal cares, has shower bench at home for use in tub, completed dressing independently - no further OT needs seen.   Anticipated status at discharge: Pt will need to demonstrate independence with bed mobility and mod indep with at least a WW for transfers and gait for mobility within her home. Pt will be mod indep w/ knee scooter and able to navigate single step to enter home. PT goals met, no further PT needs, orders completed

## 2020-07-14 NOTE — PROGRESS NOTES
07/14/20 1020   Quick Adds   Type of Visit Initial PT Evaluation   Living Environment   Lives With alone   Living Arrangements house   Home Accessibility stairs to enter home   Number of Stairs, Main Entrance 1   Transportation Anticipated family or friend will provide   Living Environment Comment single level   Self-Care   Usual Activity Tolerance good   Current Activity Tolerance good   Regular Exercise No   Equipment Currently Used at Home none   Activity/Exercise/Self-Care Comment has cruthches, ordered knee scooter   Functional Level Prior   Ambulation 0-->independent   Transferring 0-->independent   Toileting 0-->independent   Bathing 0-->independent   Fall history within last six months no   Which of the above functional risks had a recent onset or change? ambulation;transferring   General Information   Onset of Illness/Injury or Date of Surgery - Date 07/13/20   Referring Physician Tayler   Patient/Family Goals Statement return home   Pertinent History of Current Problem (include personal factors and/or comorbidities that impact the POC) s/p R TAA   Precautions/Limitations fall precautions   Weight-Bearing Status - RLE weight-bearing as tolerated  (no push off)   Cognitive Status Examination   Orientation orientation to person, place and time   Level of Consciousness alert   Follows Commands and Answers Questions 100% of the time   Personal Safety and Judgment intact   Memory intact   Pain Assessment   Patient Currently in Pain Yes, see Vital Sign flowsheet   Range of Motion (ROM)   ROM Comment Dec R ankle ROM s/p TAA   Strength   Strength Comments B LE strength WFL   Bed Mobility   Bed Mobility Comments SBA   Transfer Skills   Transfer Comments CGA w/ WW   Gait   Gait Comments SBA w/ WW and R WBAT in post op shoe, dec step length reduced gait speed   Balance   Balance Comments impaired with dec WB tolerance s/p ankle surgery   Sensory Examination   Sensory Perception Comments cont numbness R foot  "  General Therapy Interventions   Planned Therapy Interventions bed mobility training;gait training;transfer training   Clinical Impression   Criteria for Skilled Therapeutic Intervention yes, treatment indicated   PT Diagnosis difficulty walking   Influenced by the following impairments impaired gait, pain   Functional limitations due to impairments impaired functional mobility   Clinical Presentation Stable/Uncomplicated   Clinical Decision Making (Complexity) Low complexity   Therapy Frequency Daily   Predicted Duration of Therapy Intervention (days/wks) one time only   Anticipated Equipment Needs at Discharge front wheeled walker   Anticipated Discharge Disposition Home   Risk & Benefits of therapy have been explained Yes   Patient, Family & other staff in agreement with plan of care Yes   [facility] TM \"6 Clicks\"   2016, Trustees of [facility], under license to [employer].  All rights reserved.   6 Clicks Short Forms Basic Mobility Inpatient Short Form   [facility]  \"6 Clicks\" V.2 Basic Mobility Inpatient Short Form   1. Turning from your back to your side while in a flat bed without using bedrails? 4 - None   2. Moving from lying on your back to sitting on the side of a flat bed without using bedrails? 4 - None   3. Moving to and from a bed to a chair (including a wheelchair)? 4 - None   4. Standing up from a chair using your arms (e.g., wheelchair, or bedside chair)? 3 - A Little   5. To walk in hospital room? 3 - A Little   6. Climbing 3-5 steps with a railing? 3 - A Little   Basic Mobility Raw Score (Score out of 24.Lower scores equate to lower levels of function) 21   Total Evaluation Time   Total Evaluation Time (Minutes) 15     "

## 2020-07-26 NOTE — DISCHARGE SUMMARY
Admit Date:     2020   Discharge Date:     2020      ADMISSION DIAGNOSES:   1.  Advanced degenerative changes, right ankle.   2.  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   3.  Retained hardware from previous subtalar fusion.      DISCHARGE DIAGNOSES:     1.  Advanced degenerative changes, right ankle.   2.  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   3.  Retained hardware from previous subtalar fusion.      OPERATIVE PROCEDURES:   1.  Right total ankle arthroplasty.   2.  Lateral closing wedge osteotomy through the calcaneocuboid joint with calcaneocuboid fusion.   3.  Removal of screw from right subtalar joint.      ATTENDING PHYSICIAN:  Danielle Hargrove MD      HOSPITAL COURSE:  Mr. Pollard was admitted to [facility] after undergoing the aforementioned procedure at [facility] on 2020.  She tolerated the procedure well, was transferred from the operating room to the postanesthesia care unit and then to the orthopedic floor where she underwent IV, as well as p.o. pain management and mobility training with Physical Therapy.  She had an uneventful hospital course and was able to discharge home on postop day #1.        She will follow up with us at 2 and 6-week intervals.  She showed a clear understanding of these restrictions and will contact our office with any questions in the interim.         DANIELLE HARGROVE MD       As dictated by ATUL BASILIO            D: 2020   T: 2020   MT: [id number]      Name:     DARREN POLLARD   MRN:      [id number]        Account:        [id number]   :      1940           Admit Date:     2020                                  Discharge Date: 2020      Document: [id number]

## (undated) DEVICE — ESU PENCIL W/SMOKE EVAC NEPTUNE STRYKER 0703-046-000

## (undated) DEVICE — BLADE SAW RECIP STRK 77.5X11X1.23MM 0277-096-326

## (undated) DEVICE — SU VICRYL 3-0 PS-1 18" UND J683

## (undated) DEVICE — PIN PACK SALTO TALARIS 3MM LJU095

## (undated) DEVICE — BNDG ELASTIC 4" DBL LENGTH UNSTERILE 6611-14

## (undated) DEVICE — PACK EXTREMITY SOP15EXFSD

## (undated) DEVICE — STPL SKIN 35W ROTATING HEAD PRW35

## (undated) DEVICE — DRSG ADAPTIC 3X8" 6113

## (undated) DEVICE — GLOVE PROTEXIS W/NEU-THERA 8.5  2D73TE85

## (undated) DEVICE — DRILL BIT ARTHREX 2.5MM AR-8943-42

## (undated) DEVICE — DRILL BIT TORNIER 3MM LONG DWD060

## (undated) DEVICE — LINEN TOWEL PACK X5 5464

## (undated) DEVICE — SUCTION CANISTER MEDIVAC LINER 3000ML W/LID 65651-530

## (undated) DEVICE — BLADE SAW SAGITTAL STRK SHORT 35.5X9X0.64MM 2108-148-000

## (undated) DEVICE — GOWN XXLG REINFORCED 9071EL

## (undated) DEVICE — SU VICRYL 2-0 CT-2 27" UND J269H

## (undated) DEVICE — BNDG ELASTIC 4"X5YDS UNSTERILE 6611-40

## (undated) DEVICE — SOL NACL 0.9% IRRIG 1000ML BOTTLE 2F7124

## (undated) DEVICE — SPONGE LAP 18X18" X8435

## (undated) DEVICE — DRSG GAUZE 4X4" 3033

## (undated) DEVICE — CAST PLASTER SPLINT 5X30" 7395

## (undated) DEVICE — DRSG ABDOMINAL 07 1/2X8" 7197D

## (undated) DEVICE — CAST PADDING 4" UNSTERILE 9044

## (undated) DEVICE — BLADE KNIFE SURG 15 371115

## (undated) DEVICE — BLADE SAW LG BONE TORNIER 70X13X1.27MM SAW6944T

## (undated) DEVICE — PREP DURAPREP 26ML APL 8630

## (undated) DEVICE — IMM LIMB ELEVATOR DC40-0203

## (undated) DEVICE — Device

## (undated) DEVICE — CAST FIBERGLASS 4" ROLL WHITE 82004

## (undated) DEVICE — MANIFOLD NEPTUNE 4 PORT 700-20

## (undated) DEVICE — GLOVE PROTEXIS MICRO 8.5  2D73PM85

## (undated) DEVICE — SU ETHILON 3-0  FSLX 30" 1673H

## (undated) DEVICE — DRAPE SHEET REV FOLD 3/4 9349

## (undated) DEVICE — GLOVE PROTEXIS W/NEU-THERA 8.0  2D73TE80

## (undated) DEVICE — CAST PADDING 4" STERILE 9044S

## (undated) DEVICE — DRAPE COVER C-ARM SEAMLESS SNAP-KAP 03-KP26 LATEX FREE

## (undated) DEVICE — TOURNIQUET CUFF 30" STERILE

## (undated) DEVICE — GLOVE PROTEXIS W/NEU-THERA 7.5  2D73TE75

## (undated) DEVICE — IMP SCR ARTHREX CAN 4.0X26MM AR-8940-26: Type: IMPLANTABLE DEVICE | Site: ANKLE | Status: NON-FUNCTIONAL

## (undated) DEVICE — SOL WATER IRRIG 1000ML BOTTLE 2F7114

## (undated) RX ORDER — FENTANYL CITRATE 50 UG/ML
INJECTION, SOLUTION INTRAMUSCULAR; INTRAVENOUS
Status: DISPENSED
Start: 2020-07-13

## (undated) RX ORDER — FENTANYL CITRATE 0.05 MG/ML
INJECTION, SOLUTION INTRAMUSCULAR; INTRAVENOUS
Status: DISPENSED
Start: 2020-07-13

## (undated) RX ORDER — CEFAZOLIN SODIUM 2 G/100ML
INJECTION, SOLUTION INTRAVENOUS
Status: DISPENSED
Start: 2020-07-13

## (undated) RX ORDER — PROPOFOL 10 MG/ML
INJECTION, EMULSION INTRAVENOUS
Status: DISPENSED
Start: 2020-07-13